# Patient Record
Sex: FEMALE | Race: WHITE | NOT HISPANIC OR LATINO | Employment: FULL TIME | URBAN - METROPOLITAN AREA
[De-identification: names, ages, dates, MRNs, and addresses within clinical notes are randomized per-mention and may not be internally consistent; named-entity substitution may affect disease eponyms.]

---

## 2022-07-02 ENCOUNTER — HOSPITAL ENCOUNTER (EMERGENCY)
Facility: HOSPITAL | Age: 52
Discharge: HOME | End: 2022-07-02
Attending: EMERGENCY MEDICINE | Admitting: EMERGENCY MEDICINE
Payer: COMMERCIAL

## 2022-07-02 VITALS
OXYGEN SATURATION: 99 % | HEART RATE: 79 BPM | SYSTOLIC BLOOD PRESSURE: 107 MMHG | HEIGHT: 64 IN | TEMPERATURE: 98.8 F | DIASTOLIC BLOOD PRESSURE: 68 MMHG | BODY MASS INDEX: 20.66 KG/M2 | WEIGHT: 121 LBS | RESPIRATION RATE: 18 BRPM

## 2022-07-02 DIAGNOSIS — A08.4 VIRAL GASTROENTERITIS: ICD-10-CM

## 2022-07-02 LAB
ALBUMIN SERPL-MCNC: 4.1 G/DL (ref 3.5–5)
ALP SERPL-CCNC: 70 U/L (ref 45–120)
ALT SERPL W P-5'-P-CCNC: 21 U/L (ref 0–45)
ANION GAP SERPL CALCULATED.3IONS-SCNC: 7 MMOL/L (ref 5–18)
AST SERPL W P-5'-P-CCNC: 25 U/L (ref 0–40)
BASOPHILS # BLD AUTO: 0 10E3/UL (ref 0–0.2)
BASOPHILS NFR BLD AUTO: 0 %
BILIRUB SERPL-MCNC: 0.4 MG/DL (ref 0–1)
BUN SERPL-MCNC: 19 MG/DL (ref 8–22)
CALCIUM SERPL-MCNC: 9.4 MG/DL (ref 8.5–10.5)
CHLORIDE BLD-SCNC: 103 MMOL/L (ref 98–107)
CO2 SERPL-SCNC: 28 MMOL/L (ref 22–31)
CREAT SERPL-MCNC: 0.88 MG/DL (ref 0.6–1.1)
EOSINOPHIL # BLD AUTO: 0 10E3/UL (ref 0–0.7)
EOSINOPHIL NFR BLD AUTO: 0 %
ERYTHROCYTE [DISTWIDTH] IN BLOOD BY AUTOMATED COUNT: 12.7 % (ref 10–15)
GFR SERPL CREATININE-BSD FRML MDRD: 79 ML/MIN/1.73M2
GLUCOSE BLD-MCNC: 110 MG/DL (ref 70–125)
HCT VFR BLD AUTO: 41.6 % (ref 35–47)
HGB BLD-MCNC: 13.8 G/DL (ref 11.7–15.7)
IMM GRANULOCYTES # BLD: 0 10E3/UL
IMM GRANULOCYTES NFR BLD: 0 %
LIPASE SERPL-CCNC: 38 U/L (ref 0–52)
LYMPHOCYTES # BLD AUTO: 0.4 10E3/UL (ref 0.8–5.3)
LYMPHOCYTES NFR BLD AUTO: 3 %
MCH RBC QN AUTO: 30.5 PG (ref 26.5–33)
MCHC RBC AUTO-ENTMCNC: 33.2 G/DL (ref 31.5–36.5)
MCV RBC AUTO: 92 FL (ref 78–100)
MONOCYTES # BLD AUTO: 0.4 10E3/UL (ref 0–1.3)
MONOCYTES NFR BLD AUTO: 4 %
NEUTROPHILS # BLD AUTO: 11.4 10E3/UL (ref 1.6–8.3)
NEUTROPHILS NFR BLD AUTO: 93 %
NRBC # BLD AUTO: 0 10E3/UL
NRBC BLD AUTO-RTO: 0 /100
PLATELET # BLD AUTO: 291 10E3/UL (ref 150–450)
POTASSIUM BLD-SCNC: 3.9 MMOL/L (ref 3.5–5)
PROT SERPL-MCNC: 7.1 G/DL (ref 6–8)
RBC # BLD AUTO: 4.52 10E6/UL (ref 3.8–5.2)
SODIUM SERPL-SCNC: 138 MMOL/L (ref 136–145)
WBC # BLD AUTO: 12.3 10E3/UL (ref 4–11)

## 2022-07-02 PROCEDURE — 36415 COLL VENOUS BLD VENIPUNCTURE: CPT | Performed by: EMERGENCY MEDICINE

## 2022-07-02 PROCEDURE — 250N000011 HC RX IP 250 OP 636: Performed by: EMERGENCY MEDICINE

## 2022-07-02 PROCEDURE — 83690 ASSAY OF LIPASE: CPT | Performed by: EMERGENCY MEDICINE

## 2022-07-02 PROCEDURE — 80053 COMPREHEN METABOLIC PANEL: CPT | Performed by: EMERGENCY MEDICINE

## 2022-07-02 PROCEDURE — 82040 ASSAY OF SERUM ALBUMIN: CPT | Performed by: EMERGENCY MEDICINE

## 2022-07-02 PROCEDURE — 99284 EMERGENCY DEPT VISIT MOD MDM: CPT

## 2022-07-02 PROCEDURE — 85025 COMPLETE CBC W/AUTO DIFF WBC: CPT | Performed by: EMERGENCY MEDICINE

## 2022-07-02 PROCEDURE — 93005 ELECTROCARDIOGRAM TRACING: CPT | Performed by: EMERGENCY MEDICINE

## 2022-07-02 PROCEDURE — 250N000013 HC RX MED GY IP 250 OP 250 PS 637: Performed by: EMERGENCY MEDICINE

## 2022-07-02 RX ORDER — ONDANSETRON 4 MG/1
4 TABLET, ORALLY DISINTEGRATING ORAL ONCE
Status: COMPLETED | OUTPATIENT
Start: 2022-07-02 | End: 2022-07-02

## 2022-07-02 RX ORDER — ONDANSETRON 4 MG/1
4 TABLET, ORALLY DISINTEGRATING ORAL EVERY 8 HOURS PRN
Qty: 20 TABLET | Refills: 0 | Status: SHIPPED | OUTPATIENT
Start: 2022-07-02 | End: 2022-07-09

## 2022-07-02 RX ORDER — BISMUTH SUBSALICYLATE 262 MG/1
524 TABLET, CHEWABLE ORAL ONCE
Status: COMPLETED | OUTPATIENT
Start: 2022-07-02 | End: 2022-07-02

## 2022-07-02 RX ORDER — LOPERAMIDE HCL 2 MG
4 CAPSULE ORAL ONCE
Status: COMPLETED | OUTPATIENT
Start: 2022-07-02 | End: 2022-07-02

## 2022-07-02 RX ADMIN — BISMUTH SUBSALICYLATE 524 MG: 262 TABLET, CHEWABLE ORAL at 23:40

## 2022-07-02 RX ADMIN — LOPERAMIDE HYDROCHLORIDE 4 MG: 2 CAPSULE ORAL at 22:33

## 2022-07-02 RX ADMIN — ONDANSETRON 4 MG: 4 TABLET, ORALLY DISINTEGRATING ORAL at 22:33

## 2022-07-02 ASSESSMENT — ENCOUNTER SYMPTOMS
BLOOD IN STOOL: 0
DIARRHEA: 1
FEVER: 0
COUGH: 0
ABDOMINAL PAIN: 0
SHORTNESS OF BREATH: 0
CHILLS: 0
NAUSEA: 1
VOMITING: 1

## 2022-07-03 ENCOUNTER — NURSE TRIAGE (OUTPATIENT)
Dept: NURSING | Facility: CLINIC | Age: 52
End: 2022-07-03

## 2022-07-03 NOTE — ED TRIAGE NOTES
Pt took a new iron supplement this am and about an hour ago became nauseated and shaky; she had an episode of diarrhea before coming here. She reports a sensitive stomach and feels she may vomit in triage. Pt also feels like her heart is racing.     Triage Assessment     Row Name 07/02/22 3237       Triage Assessment (Adult)    Airway WDL WDL       Respiratory WDL    Respiratory WDL WDL       Skin Circulation/Temperature WDL    Skin Circulation/Temperature WDL WDL       Cardiac WDL    Cardiac WDL X  heart racing feeling       Peripheral/Neurovascular WDL    Peripheral Neurovascular WDL WDL       Cognitive/Neuro/Behavioral WDL    Cognitive/Neuro/Behavioral WDL WDL

## 2022-07-03 NOTE — ED PROVIDER NOTES
EMERGENCY DEPARTMENT ENCOUNTER      NAME: Cortney Watts  AGE: 52 year old female  YOB: 1970  MRN: [id number]  EVALUATION DATE & TIME: 7/2/2022 10:03 PM    PCP: No primary care provider on file.    ED PROVIDER: Marie Thomas M.D.      Chief Complaint   Patient presents with     Nausea     Diarrhea         FINAL IMPRESSION:  1. Viral gastroenteritis        MEDICAL DECISION MAKING:    10:07 PM I met with the patient, obtained history, performed an initial exam, and discussed options and plan for diagnostics and treatment here in the ED. PPE worn: cloth mask, surgical mask, eye protection and nitrile gloves.   11:25 PM Results were communicated with the patient. Discussed discharge plans along with return precautions. Patient was agreeable with plan.        Pertinent Labs & Imaging studies reviewed. (See chart for details)     Cortney Watts is a 52 year old female who presents with nausea/vomiting and diarrhea.  Vital signs are normal.  She does not appear overly dehydrated on exam.  Exam is benign.  No known sick contacts but notes that she ate some eggs today which she believes might have been bad.  No red flags by history such as blood in the emesis or stool, fever or severe pain.  I will get basic screening labs and give her medications for her symptoms including Pepto, Zofran and Imodium    Labs show very mild increase in her white blood cell count.  Otherwise electrolytes are normal, kidney function is normal and hemoglobin is fine.  Initially on presentation, she did complain of some palpitations so an EKG was obtained which is normal and reassuring.    She was feeling better after the medications and able to tolerate water by mouth.  I feel that she is safe for discharge home.  We did discuss warning signs and indications to return to the emergency department.  She will continue Zofran, Pepto-Bismol and Imodium for symptoms as needed.     MEDICATIONS GIVEN IN THE EMERGENCY:  Medications  "  bismuth subsalicylate (PEPTO BISMOL) chewable tablet 524 mg (has no administration in time range)   ondansetron (ZOFRAN ODT) ODT tab 4 mg (4 mg Oral Given 7/2/22 2233)   loperamide (IMODIUM) capsule 4 mg (4 mg Oral Given 7/2/22 2233)       NEW PRESCRIPTIONS STARTED AT TODAY'S ER VISIT:  New Prescriptions    ONDANSETRON (ZOFRAN ODT) 4 MG ODT TAB    Take 1 tablet (4 mg) by mouth every 8 hours as needed for nausea or vomiting          =================================================================    HPI    Patient information was obtained from: patient     Use of : Use of : N/A       Cortney Watts is a healthy 52 year old female who presents with nausea, vomiting, diarrhea.    Patient  had ate scrambled organic eggs at 4PM and 2 hours later, she endorsed sudden onset of \"yellow\" non-bloody diarrhea. She also took half a dose of her new iron supplement an hour prior to arrival. In the waiting room, she did endorse an episode of emesis and now feels significantly better from her symptoms.     She had recently gotten over periorbital cellulitis which was treated with antibiotics. She has been stocking up on probiotics lately for the past week. No prior abdominal surgeries Otherwise no associated abdominal pain, fever, chills, cough, shortness of breath. No other medical complaints at this time.     REVIEW OF SYSTEMS   Review of Systems   Constitutional: Negative for chills and fever.   Respiratory: Negative for cough and shortness of breath.    Gastrointestinal: Positive for diarrhea, nausea and vomiting. Negative for abdominal pain and blood in stool.   All other systems reviewed and are negative.       PAST MEDICAL HISTORY:  Past Medical History:   Diagnosis Date     NO ACTIVE PROBLEMS        PAST SURGICAL HISTORY:  No past surgical history on file.    CURRENT MEDICATIONS:      Current Facility-Administered Medications:      bismuth subsalicylate (PEPTO BISMOL) chewable tablet 524 mg, 524 mg, " "Oral, Once, Marie Thomas MD    Current Outpatient Medications:      ondansetron (ZOFRAN ODT) 4 MG ODT tab, Take 1 tablet (4 mg) by mouth every 8 hours as needed for nausea or vomiting, Disp: 20 tablet, Rfl: 0     NO ACTIVE MEDICATIONS, ., Disp: , Rfl:     ALLERGIES:  Allergies   Allergen Reactions     Sulfa Drugs        FAMILY HISTORY:  Family History   Problem Relation Age of Onset     Cancer Maternal Grandfather      Diabetes Paternal Grandmother        SOCIAL HISTORY:   Social History     Tobacco Use     Smoking status: Never Smoker   Substance Use Topics     Alcohol use: Yes     Comment: rare     Drug use: No        PHYSICAL EXAM:    Vitals: /68   Pulse 79   Temp 98.8  F (37.1  C) (Temporal)   Resp 18   Ht 1.626 m (5' 4\")   Wt 54.9 kg (121 lb)   SpO2 99%   BMI 20.77 kg/m     General:. Alert and interactive, comfortable appearing.  HENT: Oropharynx without erythema or exudates. MMM.  TMs clear bilaterally.  Eyes: Pupils mid-sized and equally reactive.   Neck: Full AROM.  No midline tenderness to palpation.  Cardiovascular: Regular rate and rhythm. Peripheral pulses 2+ bilaterally.  Chest/Pulmonary: Normal work of breathing. Lung sounds clear and equal throughout, no wheezes or crackles. No chest wall tenderness or deformities.  Abdomen: Soft, nondistended. Nontender without guarding or rebound.  Back/Spine: No CVA or midline tenderness.  Extremities: Normal ROM of all major joints. No lower extremity edema.   Skin: Warm and dry. Normal skin color.   Neuro: Speech clear. CNs grossly intact. Moves all extremities appropriately. Strength and sensation grossly intact to all extremities.   Psych: Normal affect/mood, cooperative, memory appropriate.     LAB:  All pertinent labs reviewed and interpreted.  Labs Ordered and Resulted from Time of ED Arrival to Time of ED Departure   CBC WITH PLATELETS AND DIFFERENTIAL - Abnormal       Result Value    WBC Count 12.3 (*)     RBC Count 4.52      Hemoglobin " 13.8      Hematocrit 41.6      MCV 92      MCH 30.5      MCHC 33.2      RDW 12.7      Platelet Count 291      % Neutrophils 93      % Lymphocytes 3      % Monocytes 4      % Eosinophils 0      % Basophils 0      % Immature Granulocytes 0      NRBCs per 100 WBC 0      Absolute Neutrophils 11.4 (*)     Absolute Lymphocytes 0.4 (*)     Absolute Monocytes 0.4      Absolute Eosinophils 0.0      Absolute Basophils 0.0      Absolute Immature Granulocytes 0.0      Absolute NRBCs 0.0     COMPREHENSIVE METABOLIC PANEL - Normal    Sodium 138      Potassium 3.9      Chloride 103      Carbon Dioxide (CO2) 28      Anion Gap 7      Urea Nitrogen 19      Creatinine 0.88      Calcium 9.4      Glucose 110      Alkaline Phosphatase 70      AST 25      ALT 21      Protein Total 7.1      Albumin 4.1      Bilirubin Total 0.4      GFR Estimate 79     LIPASE - Normal    Lipase 38         EKG:   Performed at: 2-JUL-2022  Impression: Normal sinus rhythm  Rate: 78 BPM  Rhythm: Normal sinus rhythm  QRS Interval: 74  QTc Interval: 442  No prior ECGs for comparison  I have independently reviewed and interpreted the EKG(s) documented above.       PROCEDURES:   None    I, Fanta Salty, am serving as a scribe to document services personally performed by Dr. Marie Thomas  based on my observation and the provider's statements to me. I, Marie Thomas MD attest that Fanta Pond is acting in a scribe capacity, has observed my performance of the services and has documented them in accordance with my direction.      Marie Thomas M.D.  Emergency Medicine  [facility] EMERGENCY DEPARTMENT  [facility] [facility] [id number] [phone]  Dept: [phone]         Marie Thomas MD  07/03/22 0106

## 2022-07-03 NOTE — DISCHARGE INSTRUCTIONS
Take Pepto-Bismol 2 tablets in the morning and 2 tablets in the evening while you are having symptoms.  For diarrhea you may take 2 mg of Imodium after each loose stool up to 16 mg daily.  Use Zofran 4 mg every 8 hours for nausea/vomiting.  Drink clear liquids until you are able to tolerate these without vomiting.  Then you may advance your diet to gentle foods such as applesauce, bananas, white rice, white crackers or white toast.  Once these are staying down, you may resume your normal diet.    Return to the emergency department if you are unable to tolerate oral hydration, you have severe pain or develop a fever greater than 100.5 degrees or you notice blood in your emesis or stool.

## 2022-07-03 NOTE — TELEPHONE ENCOUNTER
Patient in Ed for viral gastritis yesterday. Was prescribed Immodium and Zofran and took Pepto Bismol while in ED    They next morning patient noticed a black substance on tongue that wiped off. Informed patient that it is harmless and is from the bismuth    No more vomiting or diarrhea - clear liquids    Patient is feeling much better. Home care advised.    Sil Pritchard RN  07/03/22 3:38 PM  [facility] Nurse Advisor        Reason for Disposition    [1] Tongue looks black AND [2] after using Bismuth Subsalicylate (e.g., Kaopectate, Pepto-Bismol)    Additional Information    Negative: Injury to tooth or teeth    Negative: Injury to mouth    Negative: Canker sore suspected (i.e., aphthous ulcer) in the mouth    Negative: Cold sore suspected (i.e., fever blister sore) on the outer lip    Negative: Tooth is painful or swelling around a tooth    Negative: Mouth is painful    Negative: Throat is painful    Negative: Tongue swelling    Negative: Lip swelling    Negative: Severe difficulty breathing (e.g., struggling for each breath, speaks in single words, stridor)    Negative: Sounds like a life-threatening emergency to the triager    Negative: [1] Drooling or spitting out saliva (because can't swallow) AND [2] new onset    Negative: [1] Bleeding from mouth AND [2] won't stop after 10 minutes of direct pressure    Negative: Electrical burn of mouth    Negative: Chemical burn of mouth    Negative: [1] Difficulty breathing AND [2] not severe    Negative: Patient sounds very sick or weak to the triager    Negative: White patches that stick to tongue or inner cheek    Negative: [1] Dry mouth AND [2] new onset AND [3] unexplained (Exceptions: chronic symptom or dry mouth from mild dehydration)    Negative: Weak immune system (e.g., HIV positive, cancer chemo, splenectomy, organ transplant, chronic steroids)    Negative: Receiving chemotherapy or radiation therapy    Negative: Dentures rub and cause pain    Negative:  Painless lump in mouth    Negative: Red patch in mouth or on tongue    Negative: White patch in mouth or on tongue    Negative: Dry mouth is a chronic symptom (recurrent or ongoing AND present > 4 weeks)    Negative: Bleeding from mouth is a chronic symptom (recurrent or ongoing AND present > 4 weeks)    Negative: Cracked skin at corners of mouth (i.e., where lips come together)    Negative: [1] Chapped lips AND [2] no improvement using CARE ADVICE    Negative: All other mouth symptoms (Exceptions: dry mouth from not drinking enough liquids, chapped lips)    Negative: [1] Dry mouth AND [2] not drinking enough liquids    Protocols used: MOUTH SYMPTOMS-A-AH

## 2022-07-06 LAB
ATRIAL RATE - MUSE: 78 BPM
DIASTOLIC BLOOD PRESSURE - MUSE: NORMAL MMHG
INTERPRETATION ECG - MUSE: NORMAL
P AXIS - MUSE: 73 DEGREES
PR INTERVAL - MUSE: 142 MS
QRS DURATION - MUSE: 74 MS
QT - MUSE: 388 MS
QTC - MUSE: 442 MS
R AXIS - MUSE: 49 DEGREES
SYSTOLIC BLOOD PRESSURE - MUSE: NORMAL MMHG
T AXIS - MUSE: 58 DEGREES
VENTRICULAR RATE- MUSE: 78 BPM